# Patient Record
Sex: FEMALE | Race: WHITE | Employment: FULL TIME | URBAN - METROPOLITAN AREA
[De-identification: names, ages, dates, MRNs, and addresses within clinical notes are randomized per-mention and may not be internally consistent; named-entity substitution may affect disease eponyms.]

---

## 2017-04-11 ENCOUNTER — TELEPHONE (OUTPATIENT)
Dept: OBGYN CLINIC | Age: 52
End: 2017-04-11

## 2017-04-13 RX ORDER — FLUTICASONE PROPIONATE 50 MCG
2 SPRAY, SUSPENSION (ML) NASAL DAILY
Qty: 1 BOTTLE | Refills: 5 | Status: SHIPPED | OUTPATIENT
Start: 2017-04-13 | End: 2017-08-17 | Stop reason: SDUPTHER

## 2017-06-12 ENCOUNTER — APPOINTMENT (OUTPATIENT)
Dept: MAMMOGRAPHY | Age: 52
End: 2017-06-12

## 2017-06-12 ENCOUNTER — OFFICE VISIT (OUTPATIENT)
Dept: OBGYN CLINIC | Age: 52
End: 2017-06-12

## 2017-06-12 DIAGNOSIS — Z01.419 ENCOUNTER FOR GYNECOLOGICAL EXAMINATION (GENERAL) (ROUTINE) WITHOUT ABNORMAL FINDINGS: Primary | ICD-10-CM

## 2017-06-12 DIAGNOSIS — Z12.31 ENCOUNTER FOR SCREENING MAMMOGRAM FOR MALIGNANT NEOPLASM OF BREAST: ICD-10-CM

## 2017-06-12 NOTE — MR AVS SNAPSHOT
Visit Information Date & Time Provider Department Dept. Phone Encounter #  
 6/12/2017  9:30 AM Trace Hurley MD [facility] [phone] [id number] Follow-up Instructions Return in about 1 year (around 6/12/2018), or if symptoms worsen or fail to improve, for Annual exam. Upcoming Health Maintenance Date Due Hepatitis C Screening 1965 FOBT Q 1 YEAR AGE 50-75 6/21/2013 BREAST CANCER SCRN MAMMOGRAM 2/28/2017 PAP AKA CERVICAL CYTOLOGY 9/3/2017 INFLUENZA AGE 9 TO ADULT 8/1/2017 Allergies as of 6/12/2017  Review Complete On: 6/12/2017 By: Serafin Reed Severity Noted Reaction Type Reactions Mold Extracts  02/06/2015    Other (comments) Congestion, URI symptoms Ragweed  09/11/2012    Runny Nose Itchy eyes. Current Immunizations  Reviewed on 10/3/2014 No immunizations on file. Not reviewed this visit You Were Diagnosed With   
  
 Codes Comments Encounter for gynecological examination (general) (routine) without abnormal findings    -  Primary ICD-10-CM: J21.844 ICD-9-CM: V72.31 Vitals OB Status Smoking Status Having regular periods Never Smoker Preferred Pharmacy Pharmacy Name Phone [facility] [address] [place] [address] [phone] Your Updated Medication List  
  
   
This list is accurate as of: 6/12/17  9:44 AM.  Always use your most recent med list.  
  
  
  
  
 diclofenac EC 75 mg EC tablet Commonly known as:  VOLTAREN  
TAKE 1 TABLET BY MOUTH TWICE A DAY  
  
 fluticasone 50 mcg/actuation nasal spray Commonly known as:  Emmette Settles 2 Sprays by Both Nostrils route daily. phentermine 37.5 mg tablet Commonly known as:  ADIPEX-P Take 37.5 mg by mouth every morning. TROKENDI  mg capsule Generic drug:  topiramate ER Take 100 mg by mouth daily. Follow-up Instructions Return in about 1 year (around 6/12/2018), or if symptoms worsen or fail to improve, for Annual exam.  
  
  
Patient Instructions Well Visit, Women 48 to 72: Care Instructions Your Care Instructions Physical exams can help you stay healthy. Your doctor has checked your overall health and may have suggested ways to take good care of yourself. He or she also may have recommended tests. At home, you can help prevent illness with healthy eating, regular exercise, and other steps. Follow-up care is a key part of your treatment and safety. Be sure to make and go to all appointments, and call your doctor if you are having problems. It's also a good idea to know your test results and keep a list of the medicines you take. How can you care for yourself at home? · Reach and stay at a healthy weight. This will lower your risk for many problems, such as obesity, diabetes, heart disease, and high blood pressure. · Get at least 30 minutes of exercise on most days of the week. Walking is a good choice. You also may want to do other activities, such as running, swimming, cycling, or playing tennis or team sports. · Do not smoke. Smoking can make health problems worse. If you need help quitting, talk to your doctor about stop-smoking programs and medicines. These can increase your chances of quitting for good. · Protect your skin from too much sun. When you're outdoors from 10 a.m. to 4 p.m., stay in the shade or cover up with clothing and a hat with a wide brim. Wear sunglasses that block UV rays. Even when it's cloudy, put broad-spectrum sunscreen (SPF 30 or higher) on any exposed skin. · See a dentist one or two times a year for checkups and to have your teeth cleaned. · Wear a seat belt in the car. · Limit alcohol to 1 drink a day. Too much alcohol can cause health problems. Follow your doctor's advice about when to have certain tests. These tests can spot problems early. · Cholesterol. Your doctor will tell you how often to have this done based on your age, family history, or other things that can increase your risk for heart attack and stroke. · Blood pressure. Have your blood pressure checked during a routine doctor visit. Your doctor will tell you how often to check your blood pressure based on your age, your blood pressure results, and other factors. · Mammogram. Ask your doctor how often you should have a mammogram, which is an X-ray of your breasts. A mammogram can spot breast cancer before it can be felt and when it is easiest to treat. · Pap test and pelvic exam. Ask your doctor how often you should have a Pap test. You may not need to have a Pap test as often as you used to. · Vision. Have your eyes checked every year or two or as often as your doctor suggests. Some experts recommend that you have yearly exams for glaucoma and other age-related eye problems starting at age 48. · Hearing. Tell your doctor if you notice any change in your hearing. You can have tests to find out how well you hear. · Diabetes. Ask your doctor whether you should have tests for diabetes. · Colon cancer. You should begin tests for colon cancer at age 48. You may have one of several tests. Your doctor will tell you how often to have tests based on your age and risk. Risks include whether you already had a precancerous polyp removed from your colon or whether your parents, sisters and brothers, or children have had colon cancer. · Thyroid disease. Talk to your doctor about whether to have your thyroid checked as part of a regular physical exam. Women have an increased chance of a thyroid problem. · Osteoporosis. You should begin tests for bone density at age 72. If you are younger than 72, ask your doctor whether you have factors that may increase your risk for this disease. You may want to have this test before age 72. · Heart attack and stroke risk. At least every 4 to 6 years, you should have your risk for heart attack and stroke assessed. Your doctor uses factors such as your age, blood pressure, cholesterol, and whether you smoke or have diabetes to show what your risk for a heart attack or stroke is over the next 10 years. When should you call for help? Watch closely for changes in your health, and be sure to contact your doctor if you have any problems or symptoms that concern you. Where can you learn more? Go to http://farida-fabien.info/. Enter G060 in the search box to learn more about \"Well Visit, Women 50 to 72: Care Instructions. \" Current as of: July 19, 2016 Content Version: 11.2 © 8244-0723 [employer]. Care instructions adapted under license by [employer] (which disclaims liability or warranty for this information). If you have questions about a medical condition or this instruction, always ask your healthcare professional. [place] [address] any warranty or liability for your use of this information. Introducing [facility]! Dear Jean Vicente: Thank you for requesting a [employer] account. Our records indicate that you already have an active [employer] account. You can access your account anytime at [email or web address] Did you know that you can access your hospital and ER discharge instructions at any time in [employer]? You can also review all of your test results from your hospital stay or ER visit. Additional Information If you have questions, please visit the Frequently Asked Questions section of the [employer] website at [email or web address] Remember, [employer] is NOT to be used for urgent needs. For medical emergencies, dial 911. Now available from your iPhone and Android! Please provide this summary of care documentation to your next provider. Your primary care clinician is listed as Martita Herrera. If you have any questions after today's visit, please call [phone].

## 2017-06-12 NOTE — PROGRESS NOTES
[facility]  [email or web address]/  [phone]    David Cheng MD, FACOG       Annual Gynecologic Exam  WWE 40-60  Chief Complaint   Patient presents with    Well Woman    Sexual Problem    Missed Menses       Sanjiv Todd is a 46 y.o.  WHITE OR   female who presents for an annual exam.  No LMP recorded. .    She does report additional concerns today. Within the past 8 months, patient has experienced no menses but has had cramping as if her menses is about to start. Also has noticed a decrease in sexual desire and loss of hair within the past few months. Denies aggravating and/or alleviating factors at this time. Lots of stress, improving. Tree fell into kitchen and almost hit her. Working w Dr. Segundo Stern on weight loss. Mild hot flashes. Good relationship    Menstrual status:  Her periods are none for the past 8 months. She does not report dysmenorrhea/painful menses. She does not report irregular bleeding. Sexual history and Contraception:  History   Sexual Activity    Sexual activity: Yes    Partners: Male    Birth control/ protection: Surgical     She never uses condoms with sexual activity. She does not reports new sexual partner(s) in the last year. The patient does not request STD testing. Preventive Medicine History:  Her last annual GYN exam was about one year ago. Her most recent Pap smear result: normal was obtained in 2014. Her most recent HR HPV screen was Negative obtained 2 year(s) ago. She does not know have a history of POORNIMA 2, 3 or cervical cancer. Breast health:  Last mammogram: was normal.   A mammogram was scheduled for today. Breast cancer family updated: see . Bone health: Eva Sher She does not have a history of osteopenia/osteoporosis. Osteoporosis family history updated: see .      Past Medical History:   Diagnosis Date    History of mammogram 9/3/14; 16    negative; negative    Hypertension     boarderline    Ovarian cyst     Pap smear for cervical cancer screening     neg,hpv neg    Scabies exposure 2016     OB History    Para Term  AB SAB TAB Ectopic Multiple Living   3 3 2 1      3      # Outcome Date GA Lbr Chandan/2nd Weight Sex Delivery Anes PTL Lv   3          Y   2 Term         Y   1 Term         Y          Past Surgical History:   Procedure Laterality Date    DELIVERY       HX  SECTION      HX GYN      ovarian cyst removed    HX TONSILLECTOMY      HX TUBAL LIGATION       Family History   Problem Relation Age of Onset    Breast Cancer Mother      breast    Diabetes Mother     Heart Disease Mother     Depression Mother     Hypertension Mother     Stroke Mother     Thyroid Disease Mother     Hypertension Father     Cancer Father      prostate    Parkinsonism Maternal Aunt      Social History     Social History    Marital status:      Spouse name: N/A    Number of children: N/A    Years of education: N/A     Occupational History    Not on file. Social History Main Topics    Smoking status: Never Smoker    Smokeless tobacco: Never Used    Alcohol use 1.8 oz/week     3 Shots of liquor per week      Comment: occasionally    Drug use: No    Sexual activity: Yes     Partners: Male     Birth control/ protection: Surgical     Other Topics Concern    Not on file     Social History Narrative       Allergies   Allergen Reactions    Mold Extracts Other (comments)     Congestion, URI symptoms    Ragweed Runny Nose     Itchy eyes. Current Outpatient Prescriptions   Medication Sig    fluticasone (FLONASE) 50 mcg/actuation nasal spray 2 Sprays by Both Nostrils route daily.  diclofenac EC (VOLTAREN) 75 mg EC tablet TAKE 1 TABLET BY MOUTH TWICE A DAY    phentermine (ADIPEX-P) 37.5 mg tablet Take 37.5 mg by mouth every morning.  topiramate ER (TROKENDI XR) 100 mg capsule Take 100 mg by mouth daily.      No current facility-administered medications for this visit. There is no problem list on file for this patient. Review of Systems - History obtained from the patient  Constitutional: negative for weight loss, fever, night sweats  HEENT: negative for hearing loss, earache, congestion, snoring, sorethroat  CV: negative for chest pain, palpitations, edema  Resp: negative for cough, shortness of breath, wheezing  GI: negative for change in bowel habits, abdominal pain, black or bloody stools  : negative for frequency, dysuria, hematuria, vaginal discharge  MSK: negative for back pain, joint pain, muscle pain  Breast: negative for breast lumps, nipple discharge, galactorrhea  Skin :negative for itching, rash, hives  Neuro: negative for dizziness, headache, confusion, weakness  Psych: negative for anxiety, depression, change in mood  Heme/lymph: negative for bleeding, bruising, pallor    Physical Exam  There were no vitals taken for this visit.     Constitutional  · Appearance: well-nourished, well developed, alert, in no acute distress    HENT  · Head and Face: appears normal    Neck  · Inspection/Palpation: normal appearance, no masses or tenderness  · Lymph Nodes: no lymphadenopathy present  · Thyroid: gland size normal, nontender, no nodules or masses present on palpation    Chest  · Respiratory Effort: breathing labored  · Auscultation: normal breath sounds    Cardiovascular  · Heart:  · Auscultation: regular rate and rhythm without murmur    Breasts  · Inspection of Breasts: breasts symmetrical, no skin changes, no discharge present, nipple appearance normal, no skin retraction present  · Palpation of Breasts and Axillae: no masses present on palpation, no breast tenderness  · Axillary Lymph Nodes: no lymphadenopathy present    Gastrointestinal  · Abdominal Examination: abdomen non-tender to palpation, normal bowel sounds, no masses present  · Liver and spleen: no hepatomegaly present, spleen not palpable  · Hernias: no hernias identified    Genitourinary  · External Genitalia: normal appearance for age, no discharge present, no tenderness present, no inflammatory lesions present, no masses present, no atrophy present  · Vagina: normal vaginal vault without central or paravaginal defects, no discharge present, no inflammatory lesions present, no masses present  · Bladder: non-tender to palpation  · Urethra: appears normal  · Cervix: normal   · Uterus: normal size, shape and consistency  · Adnexa: no adnexal tenderness present, no adnexal masses present  · Perineum: perineum within normal limits, no evidence of trauma, no rashes or skin lesions present  · Anus: anus within normal limits, no hemorrhoids present  · Inguinal Lymph Nodes: no lymphadenopathy present    Skin  · General Inspection: no rash, no lesions identified    Neurologic/Psychiatric  · Mental Status:  · Orientation: grossly oriented to person, place and time  · Mood and Affect: mood normal, affect appropriate    Assessment:  46 y.o.  for well woman exam  Encounter Diagnosis   Name Primary?  Encounter for gynecological examination (general) (routine) without abnormal findings Yes       Plan:  The patient was counseled about diet, exercise, healthy lifestyle  We discussed current self breast exam and mammogram recommendations  We discussed current pap smear and HR HPV testing guidelines  We recommend follow up in one year for routine annual gynecologic exam or sooner if needed  We recommend follow up with a primary care physician for any chronic medical problems or non-gynecologic concerns    We discussed calcium/vitamin D/weight bearing exercise and osteoporosis prevention  Handouts were given to the patient  Disc typical changes in sexual response with age. Rec good use of lubrication, notify MD if want to discuss other options for libido. Pt will observe since stress is improved. TSH: normal per pt by Sicat.       Folllow up:  [x] return for annual well woman exam in one year or sooner if she is having problems  [] follow up and ultrasound  [x] mammogram  [] 6 months  [] 3 months  [] 1 month    No orders of the defined types were placed in this encounter. Results for orders placed or performed in visit on 06/12/17   [facility] MAMMO BI SCREENING INCL CAD    Narrative    STUDY: Bilateral digital screening mammogram    INDICATION:  Screening. COMPARISON:  Mammograms 229.16 and 9/3/2014. BREAST COMPOSITION:  There are scattered areas of fibroglandular density. FINDINGS: Bilateral digital screening mammography was performed and is  interpreted in conjunction with a computer assisted detection (CAD) system. No  suspicious masses or calcifications are identified. There has been no  significant change. Impression    IMPRESSION:  BI-RADS 1: Negative. No mammographic evidence of malignancy. RECOMMENDATIONS:  Next screening mammogram is recommended in one year. The patient will be notified of these results.

## 2017-06-12 NOTE — PATIENT INSTRUCTIONS
Well Visit, Women 48 to 72: Care Instructions  Your Care Instructions  Physical exams can help you stay healthy. Your doctor has checked your overall health and may have suggested ways to take good care of yourself. He or she also may have recommended tests. At home, you can help prevent illness with healthy eating, regular exercise, and other steps. Follow-up care is a key part of your treatment and safety. Be sure to make and go to all appointments, and call your doctor if you are having problems. It's also a good idea to know your test results and keep a list of the medicines you take. How can you care for yourself at home? · Reach and stay at a healthy weight. This will lower your risk for many problems, such as obesity, diabetes, heart disease, and high blood pressure. · Get at least 30 minutes of exercise on most days of the week. Walking is a good choice. You also may want to do other activities, such as running, swimming, cycling, or playing tennis or team sports. · Do not smoke. Smoking can make health problems worse. If you need help quitting, talk to your doctor about stop-smoking programs and medicines. These can increase your chances of quitting for good. · Protect your skin from too much sun. When you're outdoors from 10 a.m. to 4 p.m., stay in the shade or cover up with clothing and a hat with a wide brim. Wear sunglasses that block UV rays. Even when it's cloudy, put broad-spectrum sunscreen (SPF 30 or higher) on any exposed skin. · See a dentist one or two times a year for checkups and to have your teeth cleaned. · Wear a seat belt in the car. · Limit alcohol to 1 drink a day. Too much alcohol can cause health problems. Follow your doctor's advice about when to have certain tests. These tests can spot problems early. · Cholesterol.  Your doctor will tell you how often to have this done based on your age, family history, or other things that can increase your risk for heart attack and stroke. · Blood pressure. Have your blood pressure checked during a routine doctor visit. Your doctor will tell you how often to check your blood pressure based on your age, your blood pressure results, and other factors. · Mammogram. Ask your doctor how often you should have a mammogram, which is an X-ray of your breasts. A mammogram can spot breast cancer before it can be felt and when it is easiest to treat. · Pap test and pelvic exam. Ask your doctor how often you should have a Pap test. You may not need to have a Pap test as often as you used to. · Vision. Have your eyes checked every year or two or as often as your doctor suggests. Some experts recommend that you have yearly exams for glaucoma and other age-related eye problems starting at age 48. · Hearing. Tell your doctor if you notice any change in your hearing. You can have tests to find out how well you hear. · Diabetes. Ask your doctor whether you should have tests for diabetes. · Colon cancer. You should begin tests for colon cancer at age 48. You may have one of several tests. Your doctor will tell you how often to have tests based on your age and risk. Risks include whether you already had a precancerous polyp removed from your colon or whether your parents, sisters and brothers, or children have had colon cancer. · Thyroid disease. Talk to your doctor about whether to have your thyroid checked as part of a regular physical exam. Women have an increased chance of a thyroid problem. · Osteoporosis. You should begin tests for bone density at age 72. If you are younger than 72, ask your doctor whether you have factors that may increase your risk for this disease. You may want to have this test before age 72. · Heart attack and stroke risk. At least every 4 to 6 years, you should have your risk for heart attack and stroke assessed.  Your doctor uses factors such as your age, blood pressure, cholesterol, and whether you smoke or have diabetes to show what your risk for a heart attack or stroke is over the next 10 years. When should you call for help? Watch closely for changes in your health, and be sure to contact your doctor if you have any problems or symptoms that concern you. Where can you learn more? Go to http://farida-fabien.info/. Enter L893 in the search box to learn more about \"Well Visit, Women 50 to 72: Care Instructions. \"  Current as of: July 19, 2016  Content Version: 11.2  © 6501-6731 Healthwise[employer]. Care instructions adapted under license by [employer] (which disclaims liability or warranty for this information). If you have questions about a medical condition or this instruction, always ask your healthcare professional. [address] any warranty or liability for your use of this information.

## 2017-08-17 RX ORDER — FLUTICASONE PROPIONATE 50 MCG
SPRAY, SUSPENSION (ML) NASAL
Qty: 16 G | Refills: 5 | Status: SHIPPED | OUTPATIENT
Start: 2017-08-17

## 2017-09-29 RX ORDER — FLUTICASONE PROPIONATE 50 MCG
SPRAY, SUSPENSION (ML) NASAL
Qty: 1 BOTTLE | Refills: 11 | Status: SHIPPED | OUTPATIENT
Start: 2017-09-29 | End: 2018-07-19 | Stop reason: SDUPTHER

## 2017-10-19 ENCOUNTER — TELEPHONE (OUTPATIENT)
Dept: OBGYN CLINIC | Age: 52
End: 2017-10-19

## 2017-10-19 NOTE — TELEPHONE ENCOUNTER
1: blood work not necessary, just monitor for any bleeding. Notify MD if has bleeding if no bleeding for one year,  Can have consult to discuss in more detail prn    2: eucerine for dry skin, consider dermatology or pcp consult if ni.

## 2017-10-19 NOTE — TELEPHONE ENCOUNTER
Call received at 1:49PM      45year old patient last seen in the office on 6/12/17. Patient states she had not had a period for one year ago this month. Patient thinks she may be in menopause. Patient reports very dry skin and or left arm especially she had skin bumps and itching .       1) Patient is wondering if she should come in to have menopause blood work done    2) What kind of lotion can she use for the dry skin      Please advise

## 2017-10-20 NOTE — TELEPHONE ENCOUNTER
Patient returned call and was transferred to my phone. Patient given MD recommendations, and advised to schedule a consult visit with Dr. Stepan Marcelo. Patient verbalized understanding, and stated that she will have to call back because she is at an on-call type of job and she does not know the next time will be that she is available. This nurse advised her to keep record of her sx and if they worsen or she begins to bleed again, then she should contact our office back to schedule and appointment with Dr. Stepan Marcelo. patient verbalized understanding, and asked if her dry skin could be related to her blocked sweat glands her pcp told her she had a few months ago. This nurse advised her to follow up with her pcp for the skin problems, and recommendations if the Eucerin lotion does not help. Patient verbalized understanding, and was appreciative of the recommendations.

## 2018-07-21 RX ORDER — FLUTICASONE PROPIONATE 50 MCG
SPRAY, SUSPENSION (ML) NASAL
Qty: 1 BOTTLE | Refills: 11 | Status: SHIPPED | OUTPATIENT
Start: 2018-07-21

## 2018-09-14 ENCOUNTER — OFFICE VISIT (OUTPATIENT)
Dept: OBGYN CLINIC | Age: 53
End: 2018-09-14

## 2018-09-14 VITALS
HEIGHT: 61 IN | SYSTOLIC BLOOD PRESSURE: 138 MMHG | WEIGHT: 180 LBS | DIASTOLIC BLOOD PRESSURE: 72 MMHG | BODY MASS INDEX: 33.99 KG/M2

## 2018-09-14 DIAGNOSIS — L65.9 HAIR LOSS: ICD-10-CM

## 2018-09-14 DIAGNOSIS — Z01.419 ENCOUNTER FOR GYNECOLOGICAL EXAMINATION (GENERAL) (ROUTINE) WITHOUT ABNORMAL FINDINGS: Primary | ICD-10-CM

## 2018-09-14 DIAGNOSIS — N95.0 POSTMENOPAUSAL BLEEDING: ICD-10-CM

## 2018-09-14 DIAGNOSIS — R68.89 HEAT INTOLERANCE: ICD-10-CM

## 2018-09-14 NOTE — PATIENT INSTRUCTIONS
Learning About Menopause  What is menopause? For most women, menopause is a natural process of aging. Menstrual periods gradually stop. The ability to become pregnant ends. Some women feel relief that their childbearing years are ending. But other women struggle with the physical and emotional changes that come with menopause. For most women, menopause happens around age 48. But every woman's body has its own timeline. Some women stop having periods in their mid-40s. Others keep having periods well into their 50s. And some women go through menopause early because of cancer treatment or surgery to remove the ovaries. What can you expect with menopause? · It starts with perimenopause. This is the process of change that leads up to menopause. Perimenopause can start as early as your late 35s or as late as your early 46s. How long it lasts varies. But it usually lasts from 2 to 8 years. · During this time, your hormone levels will go up and down unevenly (fluctuate). This causes changes in your periods and other symptoms. In time, estrogen and progesterone levels drop enough that the menstrual cycle stops. Going a full year without having a period is usually considered menopause. · Low estrogen levels after menopause speed bone loss. This increases your risk of osteoporosis. Also, your risk of heart disease increases after menopause. · It's normal to have thinner, dryer, wrinkled skin after menopause. The vaginal lining and the lower urinary tract also thin and weaken. This can make it hard to have sex. It can also increase the risk of vaginal and urinary tract infections. What are the symptoms? · Lighter or heavier periods. Your menstrual cycle may be longer or shorter. You may skip periods. · Hot flashes. You may have a sudden feeling of intense body heat. You may sweat, and your head, neck, and chest may get red. Along with hot flashes, you may have a heartbeat that's too fast or not regular.  You may also feel anxious or grouchy. In rare cases you might feel panic. · Trouble sleeping. · Mood swings or feeling grouchy, depressed, or worried. · Problems with remembering or thinking clearly. · Vaginal dryness. Some women have only a few mild symptoms. Others have severe symptoms that disrupt their sleep and daily lives. Symptoms tend to last or get worse the first year or more after menopause. Over time, hormones even out at low levels. Many symptoms improve or go away. But some women may have symptoms that don't go away. How are menopause symptoms treated?   If you have mild symptoms, you may get some relief if you eat healthy foods, exercise, and lower your stress. Some women choose to take medicines if they have severe symptoms that aren't helped by making changes to their lifestyle.   Lifestyle changes    · Choose a heart-healthy diet that is low in saturated fat. It should include plenty of fish, fruits, vegetables, beans, and high-fiber grains and breads. Be sure you get enough calcium and vitamin D to help your bones stay strong. Low-fat or nonfat dairy products are a great source of calcium.     · Get regular exercise. Exercise can help you manage your weight, keep your heart and bones strong, and lift your mood.     · Limit caffeine, alcohol, and stress. These things can make symptoms worse. Limiting them may help you sleep better.     · If you smoke, stop. Quitting smoking can reduce hot flashes and long-term health risks. Medicines   If your symptoms are severe, talk with your doctor. You may want to try prescription medicines, such as:    · Low-dose birth control pills before menopause.     · Low-dose hormone therapy (HT) after menopause.     · Antidepressants.     · A medicine called clonidine (Catapres) that is usually used to treat high blood pressure.    All medicines for menopause symptoms have possible risks or side effects.  A very small number of women develop serious health problems when taking hormone therapy. Be sure to talk to your doctor about your possible health risks before you start a treatment for menopause symptoms.   Other treatments   You can try:    · Breathing exercises. They may help reduce hot flashes and emotional symptoms.     · Soy. Some women feel that eating lots of soy helps even out their menopause symptoms.     · Yoga or biofeedback. They may help reduce stress. Follow-up care is a key part of your treatment and safety. Be sure to make and go to all appointments, and call your doctor if you are having problems. It's also a good idea to know your test results and keep a list of the medicines you take. Where can you learn more? Go to http://farida-fabien.info/. Enter H199 in the search box to learn more about \"Learning About Menopause. \"  Current as of: October 6, 2017  Content Version: 11.7  © 9728-3787 [employer]. Care instructions adapted under license by [employer] (which disclaims liability or warranty for this information). If you have questions about a medical condition or this instruction, always ask your healthcare professional. [place] [address] any warranty or liability for your use of this information. Well Visit, Women 48 to 72: Care Instructions  Your Care Instructions    Physical exams can help you stay healthy. Your doctor has checked your overall health and may have suggested ways to take good care of yourself. He or she also may have recommended tests. At home, you can help prevent illness with healthy eating, regular exercise, and other steps. Follow-up care is a key part of your treatment and safety. Be sure to make and go to all appointments, and call your doctor if you are having problems. It's also a good idea to know your test results and keep a list of the medicines you take. How can you care for yourself at home? · Reach and stay at a healthy weight.  This will lower your risk for many problems, such as obesity, diabetes, heart disease, and high blood pressure. · Get at least 30 minutes of exercise on most days of the week. Walking is a good choice. You also may want to do other activities, such as running, swimming, cycling, or playing tennis or team sports. · Do not smoke. Smoking can make health problems worse. If you need help quitting, talk to your doctor about stop-smoking programs and medicines. These can increase your chances of quitting for good. · Protect your skin from too much sun. When you're outdoors from 10 a.m. to 4 p.m., stay in the shade or cover up with clothing and a hat with a wide brim. Wear sunglasses that block UV rays. Even when it's cloudy, put broad-spectrum sunscreen (SPF 30 or higher) on any exposed skin. · See a dentist one or two times a year for checkups and to have your teeth cleaned. · Wear a seat belt in the car. · Limit alcohol to 1 drink a day. Too much alcohol can cause health problems. Follow your doctor's advice about when to have certain tests. These tests can spot problems early. · Cholesterol. Your doctor will tell you how often to have this done based on your age, family history, or other things that can increase your risk for heart attack and stroke. · Blood pressure. Have your blood pressure checked during a routine doctor visit. Your doctor will tell you how often to check your blood pressure based on your age, your blood pressure results, and other factors. · Mammogram. Ask your doctor how often you should have a mammogram, which is an X-ray of your breasts. A mammogram can spot breast cancer before it can be felt and when it is easiest to treat. · Pap test and pelvic exam. Ask your doctor how often you should have a Pap test. You may not need to have a Pap test as often as you used to. · Vision. Have your eyes checked every year or two or as often as your doctor suggests.  Some experts recommend that you have yearly exams for glaucoma and other age-related eye problems starting at age 48. · Hearing. Tell your doctor if you notice any change in your hearing. You can have tests to find out how well you hear. · Diabetes. Ask your doctor whether you should have tests for diabetes. · Colon cancer. You should begin tests for colon cancer at age 48. You may have one of several tests. Your doctor will tell you how often to have tests based on your age and risk. Risks include whether you already had a precancerous polyp removed from your colon or whether your parents, sisters and brothers, or children have had colon cancer. · Thyroid disease. Talk to your doctor about whether to have your thyroid checked as part of a regular physical exam. Women have an increased chance of a thyroid problem. · Osteoporosis. You should begin tests for bone density at age 72. If you are younger than 72, ask your doctor whether you have factors that may increase your risk for this disease. You may want to have this test before age 72. · Heart attack and stroke risk. At least every 4 to 6 years, you should have your risk for heart attack and stroke assessed. Your doctor uses factors such as your age, blood pressure, cholesterol, and whether you smoke or have diabetes to show what your risk for a heart attack or stroke is over the next 10 years. When should you call for help? Watch closely for changes in your health, and be sure to contact your doctor if you have any problems or symptoms that concern you. Where can you learn more? Go to http://farida-fabien.info/. Enter R114 in the search box to learn more about \"Well Visit, Women 50 to 72: Care Instructions. \"  Current as of: May 16, 2017  Content Version: 11.7  © 2887-8389 Healthwise, Incorporated. Care instructions adapted under license by [employer] (which disclaims liability or warranty for this information).  If you have questions about a medical condition or this instruction, always ask your healthcare professional. [place] [address] any warranty or liability for your use of this information.

## 2018-09-14 NOTE — MR AVS SNAPSHOT
[address] [facility] [address] [address] [facility] 
[phone] Patient: Jung Jj MRN: [id number] :1965 Visit Information Date & Time Provider Department Dept. Phone Encounter #  
 2018  9:40 AM Markel Upton MD [facility]sm[facility] Salazar [phone] [id number] Follow-up Instructions Return in about 1 year (around 2019), or if symptoms worsen or fail to improve, for Annual exam. Upcoming Health Maintenance Date Due Hepatitis C Screening 1965 FOBT Q 1 YEAR AGE 50-75 2013 PAP AKA CERVICAL CYTOLOGY 9/3/2017 BREAST CANCER SCRN MAMMOGRAM 2018 Influenza Age 5 to Adult 2018 Allergies as of 2018  Review Complete On: 2018 By: Markle Upton MD  
  
 Severity Noted Reaction Type Reactions Mold Extracts  2015    Other (comments) Congestion, URI symptoms Ragweed  2012    Runny Nose Itchy eyes. Current Immunizations  Reviewed on 10/3/2014 No immunizations on file. Not reviewed this visit You Were Diagnosed With   
  
 Codes Comments Encounter for gynecological examination (general) (routine) without abnormal findings    -  Primary ICD-10-CM: T22.057 ICD-9-CM: V72.31 Vitals BP Height(growth percentile) Weight(growth percentile) LMP BMI OB Status 138/72 5' 1\" (1.549 m) 180 lb (81.6 kg) 2018 34.01 kg/m2 Perimenopausal  
 Smoking Status Never Smoker BMI and BSA Data Body Mass Index Body Surface Area 34.01 kg/m 2 1.87 m 2 Preferred Pharmacy Pharmacy Name Phone [facility] [address] [phone] Your Updated Medication List  
  
   
This list is accurate as of 18  9:59 AM.  Always use your most recent med list.  
  
  
  
  
 diclofenac EC 75 mg EC tablet Commonly known as:  VOLTAREN  
 TAKE 1 TABLET BY MOUTH TWICE A DAY  
  
 * fluticasone 50 mcg/actuation nasal spray Commonly known as:  FLONASE  
USE 2 SPRAYS IN EACH NOSTRIL ONCE DAILY * fluticasone 50 mcg/actuation nasal spray Commonly known as:  Brown Peel INHALE 2 SPRAYS INTO EACH NOSTRIL EVERY DAY  
  
 OTHER  
  
 phentermine 37.5 mg tablet Commonly known as:  ADIPEX-P Take 37.5 mg by mouth every morning. TROKENDI  mg capsule Generic drug:  topiramate ER Take 100 mg by mouth daily. * Notice: This list has 2 medication(s) that are the same as other medications prescribed for you. Read the directions carefully, and ask your doctor or other care provider to review them with you. Follow-up Instructions Return in about 1 year (around 9/14/2019), or if symptoms worsen or fail to improve, for Annual exam.  
  
  
Patient Instructions Learning About Menopause What is menopause? For most women, menopause is a natural process of aging. Menstrual periods gradually stop. The ability to become pregnant ends. Some women feel relief that their childbearing years are ending. But other women struggle with the physical and emotional changes that come with menopause. For most women, menopause happens around age 48. But every woman's body has its own timeline. Some women stop having periods in their mid-40s. Others keep having periods well into their 50s. And some women go through menopause early because of cancer treatment or surgery to remove the ovaries. What can you expect with menopause? · It starts with perimenopause. This is the process of change that leads up to menopause. Perimenopause can start as early as your late 35s or as late as your early 46s. How long it lasts varies. But it usually lasts from 2 to 8 years. · During this time, your hormone levels will go up and down unevenly (fluctuate). This causes changes in your periods and other symptoms.  In time, estrogen and progesterone levels drop enough that the menstrual cycle stops. Going a full year without having a period is usually considered menopause. · Low estrogen levels after menopause speed bone loss. This increases your risk of osteoporosis. Also, your risk of heart disease increases after menopause. · It's normal to have thinner, dryer, wrinkled skin after menopause. The vaginal lining and the lower urinary tract also thin and weaken. This can make it hard to have sex. It can also increase the risk of vaginal and urinary tract infections. What are the symptoms? · Lighter or heavier periods. Your menstrual cycle may be longer or shorter. You may skip periods. · Hot flashes. You may have a sudden feeling of intense body heat. You may sweat, and your head, neck, and chest may get red. Along with hot flashes, you may have a heartbeat that's too fast or not regular. You may also feel anxious or grouchy. In rare cases you might feel panic. · Trouble sleeping. · Mood swings or feeling grouchy, depressed, or worried. · Problems with remembering or thinking clearly. · Vaginal dryness. Some women have only a few mild symptoms. Others have severe symptoms that disrupt their sleep and daily lives. Symptoms tend to last or get worse the first year or more after menopause. Over time, hormones even out at low levels. Many symptoms improve or go away. But some women may have symptoms that don't go away. How are menopause symptoms treated? 
 If you have mild symptoms, you may get some relief if you eat healthy foods, exercise, and lower your stress. Some women choose to take medicines if they have severe symptoms that aren't helped by making changes to their lifestyle. 
 Lifestyle changes 
  · Choose a heart-healthy diet that is low in saturated fat. It should include plenty of fish, fruits, vegetables, beans, and high-fiber grains and breads.  Be sure you get enough calcium and vitamin D to help your bones stay strong. Low-fat or nonfat dairy products are a great source of calcium.  
  · Get regular exercise. Exercise can help you manage your weight, keep your heart and bones strong, and lift your mood.  
  · Limit caffeine, alcohol, and stress. These things can make symptoms worse. Limiting them may help you sleep better.  
  · If you smoke, stop. Quitting smoking can reduce hot flashes and long-term health risks. Medicines 
 If your symptoms are severe, talk with your doctor. You may want to try prescription medicines, such as: 
  · Low-dose birth control pills before menopause.  
  · Low-dose hormone therapy (HT) after menopause.  
  · Antidepressants.  
  · A medicine called clonidine (Catapres) that is usually used to treat high blood pressure.  
 All medicines for menopause symptoms have possible risks or side effects. A very small number of women develop serious health problems when taking hormone therapy. Be sure to talk to your doctor about your possible health risks before you start a treatment for menopause symptoms. 
 Other treatments 
 You can try: 
  · Breathing exercises. They may help reduce hot flashes and emotional symptoms.  
  · Soy. Some women feel that eating lots of soy helps even out their menopause symptoms.  
  · Yoga or biofeedback. They may help reduce stress. Follow-up care is a key part of your treatment and safety. Be sure to make and go to all appointments, and call your doctor if you are having problems. It's also a good idea to know your test results and keep a list of the medicines you take. Where can you learn more? Go to http://farida-fabien.info/. Enter H199 in the search box to learn more about \"Learning About Menopause. \" Current as of: October 6, 2017 Content Version: 11.7 © 2901-9227 Healthwise, Incorporated.  Care instructions adapted under license by [employer] (which disclaims liability or warranty for this information). If you have questions about a medical condition or this instruction, always ask your healthcare professional. [place] [address] any warranty or liability for your use of this information. Well Visit, Women 48 to 72: Care Instructions Your Care Instructions Physical exams can help you stay healthy. Your doctor has checked your overall health and may have suggested ways to take good care of yourself. He or she also may have recommended tests. At home, you can help prevent illness with healthy eating, regular exercise, and other steps. Follow-up care is a key part of your treatment and safety. Be sure to make and go to all appointments, and call your doctor if you are having problems. It's also a good idea to know your test results and keep a list of the medicines you take. How can you care for yourself at home? · Reach and stay at a healthy weight. This will lower your risk for many problems, such as obesity, diabetes, heart disease, and high blood pressure. · Get at least 30 minutes of exercise on most days of the week. Walking is a good choice. You also may want to do other activities, such as running, swimming, cycling, or playing tennis or team sports. · Do not smoke. Smoking can make health problems worse. If you need help quitting, talk to your doctor about stop-smoking programs and medicines. These can increase your chances of quitting for good. · Protect your skin from too much sun. When you're outdoors from 10 a.m. to 4 p.m., stay in the shade or cover up with clothing and a hat with a wide brim. Wear sunglasses that block UV rays. Even when it's cloudy, put broad-spectrum sunscreen (SPF 30 or higher) on any exposed skin. · See a dentist one or two times a year for checkups and to have your teeth cleaned. · Wear a seat belt in the car. · Limit alcohol to 1 drink a day. Too much alcohol can cause health problems. Follow your doctor's advice about when to have certain tests. These tests can spot problems early. · Cholesterol. Your doctor will tell you how often to have this done based on your age, family history, or other things that can increase your risk for heart attack and stroke. · Blood pressure. Have your blood pressure checked during a routine doctor visit. Your doctor will tell you how often to check your blood pressure based on your age, your blood pressure results, and other factors. · Mammogram. Ask your doctor how often you should have a mammogram, which is an X-ray of your breasts. A mammogram can spot breast cancer before it can be felt and when it is easiest to treat. · Pap test and pelvic exam. Ask your doctor how often you should have a Pap test. You may not need to have a Pap test as often as you used to. · Vision. Have your eyes checked every year or two or as often as your doctor suggests. Some experts recommend that you have yearly exams for glaucoma and other age-related eye problems starting at age 48. · Hearing. Tell your doctor if you notice any change in your hearing. You can have tests to find out how well you hear. · Diabetes. Ask your doctor whether you should have tests for diabetes. · Colon cancer. You should begin tests for colon cancer at age 48. You may have one of several tests. Your doctor will tell you how often to have tests based on your age and risk. Risks include whether you already had a precancerous polyp removed from your colon or whether your parents, sisters and brothers, or children have had colon cancer. · Thyroid disease. Talk to your doctor about whether to have your thyroid checked as part of a regular physical exam. Women have an increased chance of a thyroid problem. · Osteoporosis. You should begin tests for bone density at age 72.  If you are younger than 72, ask your doctor whether you have factors that may increase your risk for this disease. You may want to have this test before age 72. · Heart attack and stroke risk. At least every 4 to 6 years, you should have your risk for heart attack and stroke assessed. Your doctor uses factors such as your age, blood pressure, cholesterol, and whether you smoke or have diabetes to show what your risk for a heart attack or stroke is over the next 10 years. When should you call for help? Watch closely for changes in your health, and be sure to contact your doctor if you have any problems or symptoms that concern you. Where can you learn more? Go to http://farida-fabien.info/. Enter X445 in the search box to learn more about \"Well Visit, Women 50 to 72: Care Instructions. \" Current as of: May 16, 2017 Content Version: 11.7 © 7672-4710 Healthwise, Incorporated. Care instructions adapted under license by [employer] (which disclaims liability or warranty for this information). If you have questions about a medical condition or this instruction, always ask your healthcare professional. [place] [address] any warranty or liability for your use of this information. Introducing [facility]! Dear Serafin Carrillo: Thank you for requesting a [employer]Dust account. Our records indicate that you already have an active GreenDust account. You can access your account anytime at [email or web address] Did you know that you can access your hospital and ER discharge instructions at any time in GreenDust? You can also review all of your test results from your hospital stay or ER visit. Additional Information If you have questions, please visit the Frequently Asked Questions section of the GreenDust website at [email or web address] Remember, GreenDust is NOT to be used for urgent needs. For medical emergencies, dial 911. Now available from your iPhone and Android! Please provide this summary of care documentation to your next provider. Your primary care clinician is listed as Julio Villareal. If you have any questions after today's visit, please call [phone].

## 2018-09-14 NOTE — PROGRESS NOTES
[facility]  [email or web address]  [phone]    Jennifer Crump MD, [address] [facility]       Annual Gynecologic Exam  WWE   Chief Complaint   Patient presents with    Well Woman    Irregular Menses       Cr Encarnacion is a 52 y.o. G3  WHITE OR   female who presents for an annual exam.  Patient's last menstrual period was 2018. .    She does report additional concerns today. Would like to discuss hot flashes and irregular menses    Menstrual status:  Her periods are irregular cycles. Reports menses in 10/2016 and again on 2018 and none since. She does not report dysmenorrhea/painful menses. She does not report irregular bleeding. Sexual history and Contraception:  History   Sexual Activity    Sexual activity: Yes    Partners: Male    Birth control/ protection: Surgical       She does not reports new sexual partner(s) in the last year. The patient does not request STD testing. Preventive Medicine History:  Her most recent Pap smear result: normal was obtained in 2014    Her most recent HR HPV screen was Negative obtained in     She does not have a history of POORNIMA 2, 3 or cervical cancer. Breast health:  Last mammogram: approximate date 17 and was normal.   A mammogram was scheduled for today. Breast cancer family updated: see FH. Bone health: [place] She does not have a history of osteopenia/osteoporosis. Osteoporosis family history updated: see FH.      Past Medical History:   Diagnosis Date    History of mammogram 9/3/14; 16; 17    negative; negative; negative    Hypertension     boarderline    Ovarian cyst     Pap smear for cervical cancer screening     neg,hpv neg    Scabies exposure 2016     OB History    Para Term  AB Living   3 3 2 1  3   SAB TAB Ectopic Molar Multiple Live Births        3      # Outcome Date GA Lbr Chandan/2nd Weight Sex Delivery Anes PTL Lv   3          JAVI   2 Term         JAVI   1 Term         JAVI          Past Surgical History:   Procedure Laterality Date    DELIVERY       HX  SECTION      HX GYN      ovarian cyst removed    HX TONSILLECTOMY      HX TUBAL LIGATION       Family History   Problem Relation Age of Onset    Breast Cancer Mother      breast    Diabetes Mother     Heart Disease Mother     Depression Mother     Hypertension Mother     Stroke Mother     Thyroid Disease Mother     Hypertension Father     Cancer Father      prostate    Parkinsonism Maternal Aunt      Social History     Social History    Marital status:      Spouse name: N/A    Number of children: N/A    Years of education: N/A     Occupational History    Not on file. Social History Main Topics    Smoking status: Never Smoker    Smokeless tobacco: Never Used    Alcohol use 1.8 oz/week     3 Shots of liquor per week      Comment: occasionally    Drug use: No    Sexual activity: Yes     Partners: Male     Birth control/ protection: Surgical     Other Topics Concern    Not on file     Social History Narrative       Allergies   Allergen Reactions    Mold Extracts Other (comments)     Congestion, URI symptoms    Ragweed Runny Nose     Itchy eyes. Current Outpatient Prescriptions   Medication Sig    OTHER     fluticasone (FLONASE) 50 mcg/actuation nasal spray INHALE 2 SPRAYS INTO EACH NOSTRIL EVERY DAY    fluticasone (FLONASE) 50 mcg/actuation nasal spray USE 2 SPRAYS IN EACH NOSTRIL ONCE DAILY    phentermine (ADIPEX-P) 37.5 mg tablet Take 37.5 mg by mouth every morning.  diclofenac EC (VOLTAREN) 75 mg EC tablet TAKE 1 TABLET BY MOUTH TWICE A DAY    topiramate ER (TROKENDI XR) 100 mg capsule Take 100 mg by mouth daily. No current facility-administered medications for this visit. There is no problem list on file for this patient.       Review of Systems - History obtained from the patient  Constitutional: negative for weight loss, fever, night sweats +hot flashes, +weight gain  HEENT: negative for hearing loss, earache, congestion, snoring, sorethroat  CV: negative for chest pain, palpitations, edema  Resp: negative for cough, shortness of breath, wheezing  GI: negative for change in bowel habits, abdominal pain, black or bloody stools  : negative for frequency, dysuria, hematuria, vaginal discharge  MSK: negative for back pain, joint pain, muscle pain  Breast: negative for breast lumps, nipple discharge, galactorrhea  Skin :negative for itching, rash, hives, +dry skin  Neuro: negative for dizziness, headache, confusion, weakness  Psych: negative for anxiety, depression, change in mood  Heme/lymph: negative for bleeding, bruising, pallor    Physical Exam  Visit Vitals    /72    Ht 5' 1\" (1.549 m)    Wt 180 lb (81.6 kg)    LMP 06/19/2018    BMI 34.01 kg/m2       Constitutional  · Appearance: well-nourished, well developed, alert, in no acute distress    HENT  · Head and Face: appears normal    Neck  · Inspection/Palpation: normal appearance, no masses or tenderness  · Lymph Nodes: no lymphadenopathy present  · Thyroid: gland size normal, nontender, no nodules or masses present on palpation    Chest  · Respiratory Effort: breathing unlabored  · Auscultation: normal breath sounds    Cardiovascular  · Heart:  · Auscultation: regular rate and rhythm without murmur    Breasts  · Inspection of Breasts: breasts symmetrical, no skin changes, no discharge present, nipple appearance normal, no skin retraction present  · Palpation of Breasts and Axillae: no masses present on palpation, no breast tenderness  · Axillary Lymph Nodes: no lymphadenopathy present    Gastrointestinal  · Abdominal Examination: abdomen non-tender to palpation, normal bowel sounds, no masses present  · Liver and spleen: no hepatomegaly present, spleen not palpable  · Hernias: no hernias identified    Genitourinary  · External Genitalia: normal appearance for age, no discharge present, no tenderness present, no inflammatory lesions present, no masses present, without atrophy present  · Vagina: normal vaginal vault without central or paravaginal defects, no discharge present, no inflammatory lesions present, no masses present  · Bladder: non-tender to palpation  · Urethra: appears normal  · Cervix: normal   · Uterus: normal size, shape and consistency  · Adnexa: no adnexal tenderness present, no adnexal masses present  · Perineum: perineum within normal limits, no evidence of trauma, no rashes or skin lesions present  · Anus: anus within normal limits, no hemorrhoids present  · Inguinal Lymph Nodes: no lymphadenopathy present    Skin  · General Inspection: no rash, no lesions identified    Neurologic/Psychiatric  · Mental Status:  · Orientation: grossly oriented to person, place and time  · Mood and Affect: mood normal, affect appropriate    Assessment:  52 y.o.  for well woman exam  Encounter Diagnoses   Name Primary?  Encounter for gynecological examination (general) (routine) without abnormal findings Yes    Postmenopausal bleeding     Heat intolerance     Hair loss        Plan:  The patient was counseled about diet, exercise, healthy lifestyle  We discussed current self breast exam and mammogram recommendations  We discussed current pap smear and HR HPV testing guidelines  We recommend follow up in one year for routine annual gynecologic exam or sooner if needed  We recommend follow up with a primary care physician for any chronic medical problems or non-gynecologic concerns    We discussed calcium/vitamin D/weight bearing exercise and osteoporosis prevention  Handouts were given to the patient  We discussed typical post/perimenopausal bleeding patterns and symptoms. I recommend that she notify MD for chaotic or symptomatic bleeding, or bleeding in between menses or intermenstrual bleeding for additional evaluation.   She can also schedule a consult to discuss management of symptoms of perimenopause that are concerning her or interfering with her life or day to day function or activity. rec fu and us, possible endo bx  Disc importance of evaluation of  bleeding  Labs  Pt defers endo bx today, await endometrial evaluation         Folllow up:  [x] return for annual well woman exam in one year or sooner if she is having problems  [] follow up and ultrasound  [x] mammogram  [] 6 months  [] 3 months  [] 1 month    Orders Placed This Encounter    TSH REFLEX TO T4    CBC W/O DIFF    PAP IG, HPV AND RFX HPV 42/84,10([id number])       Results for orders placed or performed in visit on 09/14/18   [facility] MAMMO BI SCREENING INCL CAD    Narrative    STUDY: Bilateral digital screening mammogram    INDICATION:  Screening. COMPARISON:  Most recent 2017    BREAST COMPOSITION:  There are scattered areas of fibroglandular density. FINDINGS: Bilateral digital screening mammography was performed and is  interpreted in conjunction with a computer assisted detection (CAD) system. No  suspicious masses or calcifications are identified. There has been no  significant change. Impression    IMPRESSION:  BI-RADS 1: Negative. No mammographic evidence of malignancy. RECOMMENDATIONS:  Next screening mammogram is recommended in one year. The patient will be notified of these results.

## 2018-09-15 LAB
ERYTHROCYTE [DISTWIDTH] IN BLOOD BY AUTOMATED COUNT: 15.4 % (ref 12.3–15.4)
HCT VFR BLD AUTO: 40.5 % (ref 34–46.6)
HGB BLD-MCNC: 13.3 G/DL (ref 11.1–15.9)
MCH RBC QN AUTO: 27.6 PG (ref 26.6–33)
MCHC RBC AUTO-ENTMCNC: 32.8 G/DL (ref 31.5–35.7)
MCV RBC AUTO: 84 FL (ref 79–97)
PLATELET # BLD AUTO: 228 X10E3/UL (ref 150–379)
RBC # BLD AUTO: 4.82 X10E6/UL (ref 3.77–5.28)
TSH SERPL DL<=0.005 MIU/L-ACNC: 1.85 UIU/ML (ref 0.45–4.5)
WBC # BLD AUTO: 5.9 X10E3/UL (ref 3.4–10.8)

## 2018-09-18 LAB
CYTOLOGIST CVX/VAG CYTO: NORMAL
CYTOLOGY CVX/VAG DOC THIN PREP: NORMAL
CYTOLOGY HISTORY:: NORMAL
DX ICD CODE: NORMAL
HPV I/H RISK 1 DNA CVX QL PROBE+SIG AMP: NEGATIVE
Lab: NORMAL
OTHER STN SPEC: NORMAL
PATH REPORT.FINAL DX SPEC: NORMAL
STAT OF ADQ CVX/VAG CYTO-IMP: NORMAL

## 2019-04-17 ENCOUNTER — TELEPHONE (OUTPATIENT)
Dept: INTERNAL MEDICINE CLINIC | Age: 54
End: 2019-04-17

## 2019-04-17 RX ORDER — FLUTICASONE PROPIONATE 50 MCG
SPRAY, SUSPENSION (ML) NASAL
Refills: 11 | OUTPATIENT
Start: 2019-04-17

## 2019-04-17 NOTE — TELEPHONE ENCOUNTER
Patient  Is requesting to speak with the nurse in regards to her  Flonase medication , states she has chronic sinuitis and needs to know what she can take OTC that would be equivalent, She can be reached at [phone] , was informed she must find new PCP due to Dr Carlos Pang being on Medical Leave

## 2019-04-17 NOTE — TELEPHONE ENCOUNTER
Prescription denied. Last seen 10/2016. Left message advising to find new PCP out of office.     March Tremayne, PharmD, BCPS, CDE

## 2019-04-17 NOTE — TELEPHONE ENCOUNTER
Returned call. Left message that flonase is OTC now and would be fine to use.     Sujit Wilkerson, PharmD, BCPS, CDE

## 2021-03-15 ENCOUNTER — TELEPHONE (OUTPATIENT)
Dept: INTERNAL MEDICINE CLINIC | Age: 56
End: 2021-03-15

## 2021-03-15 NOTE — TELEPHONE ENCOUNTER
----- Message from Zenia Connor sent at 3/12/2021  3:53 PM EST -----  Regarding: Previous Dr Adi To pt/telephone  General Message/Vendor Calls    Caller's first and last name: Pt      Reason for call: Records       Callback required yes/no and why: yes.  To confirm      Best contact number(s):[phone]    Details to clarify the request: Pt moved and has requested and signed the medical release form for her chart to be faxed to [facility] [phone]      Zenia Connor